# Patient Record
Sex: MALE | URBAN - METROPOLITAN AREA
[De-identification: names, ages, dates, MRNs, and addresses within clinical notes are randomized per-mention and may not be internally consistent; named-entity substitution may affect disease eponyms.]

---

## 2022-10-04 ENCOUNTER — HOSPITAL ENCOUNTER (OUTPATIENT)
Dept: ONCOLOGY | Age: 47
Setting detail: INFUSION SERIES
Discharge: HOME | End: 2022-10-04
Payer: COMMERCIAL

## 2022-10-04 VITALS
HEART RATE: 94 BPM | OXYGEN SATURATION: 95 % | TEMPERATURE: 97.9 F | WEIGHT: 178.57 LBS | RESPIRATION RATE: 16 BRPM | SYSTOLIC BLOOD PRESSURE: 148 MMHG | HEIGHT: 71 IN | BODY MASS INDEX: 25 KG/M2 | DIASTOLIC BLOOD PRESSURE: 84 MMHG

## 2022-10-04 LAB
A/G RATIO: 1.5 (ref 1.1–2.2)
ABO/RH: NORMAL
ALBUMIN SERPL-MCNC: 4.5 G/DL (ref 3.4–5)
ALP BLD-CCNC: 63 U/L (ref 40–129)
ALT SERPL-CCNC: 107 U/L (ref 10–40)
ANION GAP SERPL CALCULATED.3IONS-SCNC: 11 MMOL/L (ref 3–16)
ANTIBODY SCREEN: NORMAL
AST SERPL-CCNC: 40 U/L (ref 15–37)
BASOPHILS ABSOLUTE: 0 K/UL (ref 0–0.2)
BASOPHILS RELATIVE PERCENT: 0.7 %
BILIRUB SERPL-MCNC: 0.6 MG/DL (ref 0–1)
BILIRUBIN URINE: NEGATIVE
BLOOD, URINE: NEGATIVE
BUN BLDV-MCNC: 15 MG/DL (ref 7–20)
CALCIUM SERPL-MCNC: 9.2 MG/DL (ref 8.3–10.6)
CHLORIDE BLD-SCNC: 101 MMOL/L (ref 99–110)
CLARITY: CLEAR
CO2: 24 MMOL/L (ref 21–32)
COLOR: YELLOW
CREAT SERPL-MCNC: 0.9 MG/DL (ref 0.9–1.3)
EOSINOPHILS ABSOLUTE: 0.1 K/UL (ref 0–0.6)
EOSINOPHILS RELATIVE PERCENT: 2.2 %
GFR AFRICAN AMERICAN: >60
GFR NON-AFRICAN AMERICAN: >60
GLUCOSE BLD-MCNC: 108 MG/DL (ref 70–99)
GLUCOSE URINE: NEGATIVE MG/DL
HCT VFR BLD CALC: 44 % (ref 40.5–52.5)
HEMOGLOBIN: 14.9 G/DL (ref 13.5–17.5)
KETONES, URINE: NEGATIVE MG/DL
LACTATE DEHYDROGENASE: 175 U/L (ref 100–190)
LEUKOCYTE ESTERASE, URINE: NEGATIVE
LYMPHOCYTES ABSOLUTE: 1.3 K/UL (ref 1–5.1)
LYMPHOCYTES RELATIVE PERCENT: 23.6 %
MCH RBC QN AUTO: 31.3 PG (ref 26–34)
MCHC RBC AUTO-ENTMCNC: 33.9 G/DL (ref 31–36)
MCV RBC AUTO: 92.5 FL (ref 80–100)
MICROSCOPIC EXAMINATION: NORMAL
MONOCYTES ABSOLUTE: 0.4 K/UL (ref 0–1.3)
MONOCYTES RELATIVE PERCENT: 8 %
NEUTROPHILS ABSOLUTE: 3.6 K/UL (ref 1.7–7.7)
NEUTROPHILS RELATIVE PERCENT: 65.5 %
NITRITE, URINE: NEGATIVE
PDW BLD-RTO: 12.8 % (ref 12.4–15.4)
PH UA: 7 (ref 5–8)
PLATELET # BLD: 229 K/UL (ref 135–450)
PMV BLD AUTO: 7.2 FL (ref 5–10.5)
POTASSIUM REFLEX MAGNESIUM: 4 MMOL/L (ref 3.5–5.1)
PROTEIN UA: NEGATIVE MG/DL
RBC # BLD: 4.75 M/UL (ref 4.2–5.9)
REASON FOR REJECTION: NORMAL
REJECTED TEST: NORMAL
SODIUM BLD-SCNC: 136 MMOL/L (ref 136–145)
SPECIFIC GRAVITY UA: <=1.005 (ref 1–1.03)
TOTAL PROTEIN: 7.6 G/DL (ref 6.4–8.2)
URINE TYPE: NORMAL
UROBILINOGEN, URINE: 0.2 E.U./DL
WBC # BLD: 5.6 K/UL (ref 4–11)

## 2022-10-04 PROCEDURE — 85025 COMPLETE CBC W/AUTO DIFF WBC: CPT

## 2022-10-04 PROCEDURE — 86901 BLOOD TYPING SEROLOGIC RH(D): CPT

## 2022-10-04 PROCEDURE — 81003 URINALYSIS AUTO W/O SCOPE: CPT

## 2022-10-04 PROCEDURE — 80053 COMPREHEN METABOLIC PANEL: CPT

## 2022-10-04 PROCEDURE — 86900 BLOOD TYPING SEROLOGIC ABO: CPT

## 2022-10-04 PROCEDURE — 86850 RBC ANTIBODY SCREEN: CPT

## 2022-10-04 PROCEDURE — 83615 LACTATE (LD) (LDH) ENZYME: CPT

## 2022-10-04 NOTE — ONCOLOGY
[facility] donor seen in [address] [place] [address] infusion department for labs/teach prior to [facility] [place].    Donor given the below information re: the procedure. Donor verbalized understanding of using CHG soap morning of procedure and NPO after midnight evening before. [facility] [place] Procedure:    Before your surgery begins the nurse will start an IV to give you fluids before, during and after the procedure. You may also be given medication to make you sleepy before going into the operating room. Family members may remain with you until this time and then they will be asked to go to the Surgery Waiting Area. Once in the operating room, the nurses and doctors will be wearing masks. An anesthesiologist will give you medication through your IV to put you to sleep. Once you are asleep, a small tube will be placed down your windpipe (trachea) to maintain an open airway and assist your breathing. Then you will be turned over onto your stomach and covered with sterile drapes. The harvesting of your bone marrow is done by the insertion of a needle into the center of your hipbones. Bone marrow is then withdrawn through the needle. There will be 1 to 2 skin punctures (harvest sites) on each side of your pelvis. Once the procedure is finished, a large pressure bandage will be placed over the harvest sites to help prevent bleeding. Then you will be transferred to the recovery unit where your family can visit you. The entire hospital time for the bone marrow harvest should last 6-10 hours (allowing time for the induction of anesthesia, the harvest and recovery). Dont get up for the first time after the procedure without assistance, as you may experience dizziness. Ask for assistance from the nursing staff if you want to get up and walk around after donation. Even though you might be stiff and sore, many donors report feeling better if they are able to move around.     You will be discharged from the recovery room after you are awake and alert, able to eat and drink, able to urinate and able to walk. You may want to bring a pillow for the car to cushion your lower back on the trip home.

## 2022-10-05 ENCOUNTER — HOSPITAL ENCOUNTER (OUTPATIENT)
Dept: ONCOLOGY | Age: 47
Setting detail: INFUSION SERIES
Discharge: HOME | End: 2022-10-05

## 2022-10-05 NOTE — CONSULTS
[facility] History and Physical       Attending Physician: No att. providers found    Primary Care: No primary care provider on file. Referring MD: Stephanie Reagan MD  [address]  [place],  [address]    Name: Will Pryor :  1975  MRN:  [id number]    Admission: 10/4/2022      Date: 10/4/2022    Reason for visit: Stem Cell donor     History of Present Illness:   PT is a 67-year-old male with no significant medical history who is here for evaluation for stem cell donation. Patient is healthy and is not on any medications. He denies any infections recently, fevers or chills. He does not smoke drink alcohol or use any illicit drugs. He denies any past history of surgery. We discussed the procedure of a stem cell harvest as well as stem cell donation. He understands the involvement of general anesthesia and the risk associated with that he understands requirement of mechanical ventilation for the procedure. PMHX   None     PSHX  None     Social History  Denies any alcohol, tobacco or illicit drug abuse     Occupation:           ROS:  As noted above, otherwise remainder of 10-point ROS negative    Physical Exam:     Vital Signs:  BP (!) 148/84   Pulse 94   Temp 97.9 °F (36.6 °C) (Oral)   Resp 16   Ht 5' 11.26\" (1.81 m)   Wt 178 lb 9.2 oz (81 kg)   SpO2 95%   BMI 24.72 kg/m²     Weight:    Wt Readings from Last 3 Encounters:   10/04/22 178 lb 9.2 oz (81 kg)       KPS: 100% Normal no complaints; no evidence of disease    ECOG PS:  (0) Fully active, able to carry on all predisease performance without restriction    General: Awake, alert and oriented.   HEENT: normocephalic, PERRL, no scleral erythema or icterus, Oral mucosa moist and intact, throat clear  NECK: supple without palpable adenopathy  BACK: Straight negative CVAT  SKIN: warm dry and intact without lesions rashes or masses  CHEST: CTA bilaterally without use of accessory muscles  CV: Normal S1 S2, RRR, no MRG  ABD: NT ND normoactive BS, no palpable masses or hepatosplenomegaly  EXTREMITIES: without edema, denies calf tenderness  NEURO: CN II - XII grossly intact    Laboratory Data:   CBC:   Recent Labs     10/04/22  1417   WBC 5.6   HGB 14.9   HCT 44.0   MCV 92.5        BMP/Mag:  Recent Labs     10/04/22  1417      K 4.0      CO2 24   BUN 15   CREATININE 0.9     LIVP:   Recent Labs     10/04/22  1417   AST 40*   *   BILITOT 0.6   ALKPHOS 63     Coags: No results for input(s): PROTIME, INR, APTT in the last 72 hours. Uric Acid No results for input(s): LABURIC in the last 72 hours. ASSESSMENT AND PLAN:          PT is a 73-year-old male with no significant medical history who is here for evaluation for stem cell donation. plan for bone marrow harvest  - Discussed general anesthesia, procedure, risks associated with it   -  His labs are normal except for mild AST and ALT elevation  - No clinical symptoms, no RUQ pain, T bilirubin is normal. No signs of active infection. Hepatitis serologies unremarkable except for Hep A IgG is positive   --  By history and physical exam I see no underlying findings that would prohibit her from moving forward with stem cell donation and undergoing general anesthesia.       Ron Yeung DO

## 2022-10-20 NOTE — PROGRESS NOTES
Place patient label inside box (if no patient label, complete below)  Name:  :  MR#:     Sheila Stanford / [address]  I (we), Jaylene FRY (Patient Name) authorize DR. Leanna Garcia (Provider / Chi Iverson) and/or such assistants as may be selected by him/her, to perform the following operation/procedure(s): BONE MARROW HARVEST       Note: If unable to obtain consent prior to an emergent procedure, document the emergent reason in the medical record. This procedure has been explained to my (our) satisfaction and included in the explanation was: The intended benefit, nature, and extent of the procedure to be performed; The significant risks involved and the probability of success; Alternative procedures and methods of treatment; The dangers and probable consequences of such alternatives (including no procedure or treatment); The expected consequences of the procedure on my future health; Whether other qualified individuals would be performing important surgical tasks and/or whether  would be present to advise or support the procedure. I (we) understand that there are other risks of infection and other serious complications in the pre-operative/procedural and postoperative/procedural stages of my (our) care. I (we) have asked all of the questions which I (we) thought were important in deciding whether or not to undergo treatment or diagnosis. These questions have been answered to my (our) satisfaction. I (we) understand that no assurance can be given that the procedure will be a success, and no guarantee or warranty of success has been given to me (us). It has been explained to me (us) that during the course of the operation/procedure, unforeseen conditions may be revealed that necessitate extension of the original procedure(s) or different procedure(s) than those set forth in Paragraph 1.  I (we) authorize and request that the above-named physician, his/her assistants or his/her designees, perform procedures as necessary and desirable if deemed to be in my (our) best interest.     Revised 8/2/2021                                                                          Page 1 of 2       I acknowledge that health care personnel may be observing this procedure for the purpose of medical education or other specified purposes as may be necessary as requested and/or approved by my (our) physician. I (we) consent to the disposal by the hospital Pathologist of the removed tissue, parts or organs in accordance with hospital policy. I do ____ do not ____ consent to the use of a local infiltration pain blocking agent that will be used by my provider/surgical provider to help alleviate pain during my procedure. I do ____ do not ____ consent to an emergent blood transfusion in the case of a life-threatening situation that requires blood components to be administered. This consent is valid for 24 hours from the beginning of the procedure. This patient does ____ or does not ____ currently have a DNR status/order. If DNR order is in place, obtain Addendum to the Surgical Consent for ALL Patients with a DNR Order to address ruth-operative status for limited intervention or DNR suspension.      I have read and fully understand the above Consent for Operation/Procedure and that all blanks were completed before I signed the consent.   _____________________________       _____________________      ____/____am/pm  Signature of Patient or legal representative      Printed Name / Relationship            Date / Time   ____________________________       _____________________      ____/____am/pm  Witness to Signature                                    Printed Name                    Date / Time    If patient is unable to sign or is a minor, complete the following)  Patient is a minor, ____ years of age, or unable to sign because: ______________________________________________________________________________________________    If a phone consent is obtained, consent will be documented by using two health care professionals, each affirming that the consenting party has no questions and gives consent for the procedure discussed with the physician/provider.   _____________________          ____________________       _____/_____am/pm   2nd witness to phone consent        Printed name           Date / Time    Informed Consent:  I have provided the explanation described above in section 1 to the patient and/or legal representative.  I have provided the patient and/or legal representative with an opportunity to ask any questions about the proposed operation/procedure.   ___________________________          ____________________         ____/____am/pm  Provider / Proceduralist                            Printed name            Date / Time  Revised 8/2/2021                                                                      Page 2 of 2

## 2022-10-21 ENCOUNTER — ANESTHESIA EVENT (OUTPATIENT)
Dept: OPERATING ROOM | Age: 47
End: 2022-10-21
Payer: COMMERCIAL

## 2022-10-21 NOTE — PROGRESS NOTES
PRE-OP INSTRUCTIONS FOR SURGICAL PATIENTS          Our Pre-admission Testing Nurses tried and were unable to reach you today. Please read the attached instructions if you did not listen to your voicemail. Follow all instructions provided to you from your surgeon's office, including your ARRIVAL TIME. Arrange for someone to drive you home and be with you for the first 24 hours after discharge. NOTE: at this time ONLY 2 ADULTS may accompany you and everyone must be masked. Enter the MAIN entrance located on [address] and report to the surgical desk on the LEFT side of the lobby. Please park in the parking garage or there is free [employer] available after 7am for your use. Bring your  photo ID with you to register. Bring your medication list with you with dose and frequency listed (including over the counter medications)  Contact your ordering physician/surgeon for medication instructions as soon as possible, especially if taking blood thinners, aspirin, heart, or diabetic medication. Bariatric surgical patients need to call your surgeon if on diabetic medications (as some may need to be stopped 1-week preop)  A Pre-Surgical History and Physical MUST be completed WITHIN 30 DAYS OR LESS prior to your procedure by your Physician or an Urgent Care. DO NOT EAT ANYTHING 8 hours prior to arrival for surgery. You may have sips of WATER ONLY (up to 8 ounces) 4 hours prior to your arrival for surgery. Then nothing further 4 hours prior to arriving at hospital.   NOTE: ALL Gastric, Bariatric & Bowel surgery patients - you MUST follow your surgeon's instructions regarding eating/drinking as you will have very specific instructions to follow. If you did not receive these, call your surgeon's office immediately. No gum, candy, mints, or ice chips day of procedure. Please refrain from drinking alcohol the day before or day of your procedure.    Do not use any recreational marijuana at least 24 hours or street drugs (heroin, cocaine) at minimum 5 days prior to your procedure. Please do not smoke the day of your procedure. Dress in loose, comfortable clothing appropriate for redressing after your procedure. Do not wear jewelry (including body piercings), make-up, fingernail polish, lotion, powders, or metal hairclips. Contacts, glasses, dentures, and hearing aids will need to be removed prior to surgery. Bring your case(s) to protect them while you are in surgery. If you use a CPAP, please bring it with you on the day of your procedure. Do not shave or wax for 72 hours prior to procedure near your operative site. Leave all other valuables at home. IF there is a change in your physical condition for some reason, such as: a cold, fever, rash, cuts, sores, or any other infection, especially near your surgical site, contact your surgeon's office immediately. FOR WOMAN OF CHILDBEARING AGE ONLY- please make sure we can collect a urine sample upon arrival.     Instructions left on patient's voicemail.   Tiffany Live RN.10/21/2022 .7:47 AM

## 2022-10-21 NOTE — PROGRESS NOTES
Elevated ALT & AST - results reviewed with Hailey at [facility].  She stated they were aware and patient will follow up with PCP. Jayleen Hdz

## 2022-10-24 ENCOUNTER — ANESTHESIA (OUTPATIENT)
Dept: OPERATING ROOM | Age: 47
End: 2022-10-24
Payer: COMMERCIAL

## 2022-10-24 ENCOUNTER — HOSPITAL ENCOUNTER (OUTPATIENT)
Age: 47
Setting detail: OUTPATIENT SURGERY
Discharge: HOME | End: 2022-10-24
Attending: INTERNAL MEDICINE | Admitting: INTERNAL MEDICINE
Payer: COMMERCIAL

## 2022-10-24 VITALS
SYSTOLIC BLOOD PRESSURE: 123 MMHG | BODY MASS INDEX: 24.75 KG/M2 | RESPIRATION RATE: 14 BRPM | TEMPERATURE: 97 F | HEIGHT: 71 IN | HEART RATE: 71 BPM | OXYGEN SATURATION: 100 % | WEIGHT: 176.8 LBS | DIASTOLIC BLOOD PRESSURE: 84 MMHG

## 2022-10-24 DIAGNOSIS — G89.18 PAIN AT SURGICAL SITE: Primary | ICD-10-CM

## 2022-10-24 LAB
ABO/RH: NORMAL
ANTIBODY SCREEN: NORMAL
BASOPHILS ABSOLUTE: 0 K/UL (ref 0–0.2)
BASOPHILS RELATIVE PERCENT: 0.7 %
EOSINOPHILS ABSOLUTE: 0.1 K/UL (ref 0–0.6)
EOSINOPHILS RELATIVE PERCENT: 2.4 %
HCT VFR BLD CALC: 44.1 % (ref 40.5–52.5)
HEMOGLOBIN: 14.9 G/DL (ref 13.5–17.5)
LYMPHOCYTES ABSOLUTE: 2.1 K/UL (ref 1–5.1)
LYMPHOCYTES RELATIVE PERCENT: 36.3 %
MCH RBC QN AUTO: 31 PG (ref 26–34)
MCHC RBC AUTO-ENTMCNC: 33.7 G/DL (ref 31–36)
MCV RBC AUTO: 91.9 FL (ref 80–100)
MONOCYTES ABSOLUTE: 0.5 K/UL (ref 0–1.3)
MONOCYTES RELATIVE PERCENT: 8.5 %
NEUTROPHILS ABSOLUTE: 3 K/UL (ref 1.7–7.7)
NEUTROPHILS RELATIVE PERCENT: 52.1 %
PDW BLD-RTO: 12.6 % (ref 12.4–15.4)
PLATELET # BLD: 220 K/UL (ref 135–450)
PMV BLD AUTO: 7.8 FL (ref 5–10.5)
RBC # BLD: 4.79 M/UL (ref 4.2–5.9)
WBC # BLD: 5.7 K/UL (ref 4–11)

## 2022-10-24 PROCEDURE — 7100000001 HC PACU RECOVERY - ADDTL 15 MIN: Performed by: STUDENT IN AN ORGANIZED HEALTH CARE EDUCATION/TRAINING PROGRAM

## 2022-10-24 PROCEDURE — 85025 COMPLETE CBC W/AUTO DIFF WBC: CPT

## 2022-10-24 PROCEDURE — 3600000002 HC SURGERY LEVEL 2 BASE: Performed by: STUDENT IN AN ORGANIZED HEALTH CARE EDUCATION/TRAINING PROGRAM

## 2022-10-24 PROCEDURE — 6360000002 HC RX W HCPCS: Performed by: ANESTHESIOLOGY

## 2022-10-24 PROCEDURE — 86900 BLOOD TYPING SEROLOGIC ABO: CPT

## 2022-10-24 PROCEDURE — 7100000011 HC PHASE II RECOVERY - ADDTL 15 MIN: Performed by: STUDENT IN AN ORGANIZED HEALTH CARE EDUCATION/TRAINING PROGRAM

## 2022-10-24 PROCEDURE — 3700000001 HC ADD 15 MINUTES (ANESTHESIA): Performed by: STUDENT IN AN ORGANIZED HEALTH CARE EDUCATION/TRAINING PROGRAM

## 2022-10-24 PROCEDURE — 7100000000 HC PACU RECOVERY - FIRST 15 MIN: Performed by: STUDENT IN AN ORGANIZED HEALTH CARE EDUCATION/TRAINING PROGRAM

## 2022-10-24 PROCEDURE — 86901 BLOOD TYPING SEROLOGIC RH(D): CPT

## 2022-10-24 PROCEDURE — 6360000002 HC RX W HCPCS: Performed by: NURSE ANESTHETIST, CERTIFIED REGISTERED

## 2022-10-24 PROCEDURE — 3600000012 HC SURGERY LEVEL 2 ADDTL 15MIN: Performed by: STUDENT IN AN ORGANIZED HEALTH CARE EDUCATION/TRAINING PROGRAM

## 2022-10-24 PROCEDURE — 2709999900 HC NON-CHARGEABLE SUPPLY: Performed by: STUDENT IN AN ORGANIZED HEALTH CARE EDUCATION/TRAINING PROGRAM

## 2022-10-24 PROCEDURE — 2500000003 HC RX 250 WO HCPCS: Performed by: NURSE ANESTHETIST, CERTIFIED REGISTERED

## 2022-10-24 PROCEDURE — 3700000000 HC ANESTHESIA ATTENDED CARE: Performed by: STUDENT IN AN ORGANIZED HEALTH CARE EDUCATION/TRAINING PROGRAM

## 2022-10-24 PROCEDURE — 2580000003 HC RX 258: Performed by: ANESTHESIOLOGY

## 2022-10-24 PROCEDURE — 86850 RBC ANTIBODY SCREEN: CPT

## 2022-10-24 PROCEDURE — 7100000010 HC PHASE II RECOVERY - FIRST 15 MIN: Performed by: STUDENT IN AN ORGANIZED HEALTH CARE EDUCATION/TRAINING PROGRAM

## 2022-10-24 PROCEDURE — 2500000003 HC RX 250 WO HCPCS: Performed by: STUDENT IN AN ORGANIZED HEALTH CARE EDUCATION/TRAINING PROGRAM

## 2022-10-24 RX ORDER — ONDANSETRON 2 MG/ML
4 INJECTION INTRAMUSCULAR; INTRAVENOUS
Status: COMPLETED | OUTPATIENT
Start: 2022-10-24 | End: 2022-10-24

## 2022-10-24 RX ORDER — GLYCOPYRROLATE 0.2 MG/ML
INJECTION INTRAMUSCULAR; INTRAVENOUS PRN
Status: DISCONTINUED | OUTPATIENT
Start: 2022-10-24 | End: 2022-10-24 | Stop reason: SDUPTHER

## 2022-10-24 RX ORDER — SODIUM CHLORIDE 0.9 % (FLUSH) 0.9 %
5-40 SYRINGE (ML) INJECTION PRN
OUTPATIENT
Start: 2022-10-24

## 2022-10-24 RX ORDER — LIDOCAINE HYDROCHLORIDE 20 MG/ML
INJECTION, SOLUTION INTRAVENOUS PRN
Status: DISCONTINUED | OUTPATIENT
Start: 2022-10-24 | End: 2022-10-24 | Stop reason: SDUPTHER

## 2022-10-24 RX ORDER — OXYCODONE HYDROCHLORIDE AND ACETAMINOPHEN 5; 325 MG/1; MG/1
1 TABLET ORAL EVERY 4 HOURS PRN
OUTPATIENT
Start: 2022-10-24

## 2022-10-24 RX ORDER — SODIUM CHLORIDE 9 MG/ML
INJECTION, SOLUTION INTRAVENOUS PRN
OUTPATIENT
Start: 2022-10-24

## 2022-10-24 RX ORDER — OXYCODONE HYDROCHLORIDE AND ACETAMINOPHEN 5; 325 MG/1; MG/1
2 TABLET ORAL EVERY 4 HOURS PRN
OUTPATIENT
Start: 2022-10-24

## 2022-10-24 RX ORDER — SODIUM CHLORIDE 0.9 % (FLUSH) 0.9 %
5-40 SYRINGE (ML) INJECTION PRN
Status: DISCONTINUED | OUTPATIENT
Start: 2022-10-24 | End: 2022-10-24 | Stop reason: HOSPADM

## 2022-10-24 RX ORDER — LABETALOL HYDROCHLORIDE 5 MG/ML
10 INJECTION, SOLUTION INTRAVENOUS
Status: DISCONTINUED | OUTPATIENT
Start: 2022-10-24 | End: 2022-10-24 | Stop reason: HOSPADM

## 2022-10-24 RX ORDER — FENTANYL CITRATE 50 UG/ML
INJECTION, SOLUTION INTRAMUSCULAR; INTRAVENOUS PRN
Status: DISCONTINUED | OUTPATIENT
Start: 2022-10-24 | End: 2022-10-24 | Stop reason: SDUPTHER

## 2022-10-24 RX ORDER — MORPHINE SULFATE 4 MG/ML
2 INJECTION, SOLUTION INTRAMUSCULAR; INTRAVENOUS
OUTPATIENT
Start: 2022-10-24

## 2022-10-24 RX ORDER — SUCCINYLCHOLINE/SOD CL,ISO/PF 200MG/10ML
SYRINGE (ML) INTRAVENOUS PRN
Status: DISCONTINUED | OUTPATIENT
Start: 2022-10-24 | End: 2022-10-24 | Stop reason: SDUPTHER

## 2022-10-24 RX ORDER — MORPHINE SULFATE 4 MG/ML
4 INJECTION, SOLUTION INTRAMUSCULAR; INTRAVENOUS
OUTPATIENT
Start: 2022-10-24

## 2022-10-24 RX ORDER — ONDANSETRON 2 MG/ML
INJECTION INTRAMUSCULAR; INTRAVENOUS PRN
Status: DISCONTINUED | OUTPATIENT
Start: 2022-10-24 | End: 2022-10-24 | Stop reason: SDUPTHER

## 2022-10-24 RX ORDER — SODIUM CHLORIDE 0.9 % (FLUSH) 0.9 %
5-40 SYRINGE (ML) INJECTION EVERY 12 HOURS SCHEDULED
Status: DISCONTINUED | OUTPATIENT
Start: 2022-10-24 | End: 2022-10-24 | Stop reason: HOSPADM

## 2022-10-24 RX ORDER — SODIUM CITRATE 4 % (5 ML)
SYRINGE (ML) MISCELLANEOUS PRN
Status: DISCONTINUED | OUTPATIENT
Start: 2022-10-24 | End: 2022-10-24 | Stop reason: ALTCHOICE

## 2022-10-24 RX ORDER — SODIUM CHLORIDE 9 MG/ML
INJECTION, SOLUTION INTRAVENOUS PRN
Status: DISCONTINUED | OUTPATIENT
Start: 2022-10-24 | End: 2022-10-24 | Stop reason: HOSPADM

## 2022-10-24 RX ORDER — OXYCODONE HYDROCHLORIDE AND ACETAMINOPHEN 5; 325 MG/1; MG/1
1 TABLET ORAL EVERY 6 HOURS PRN
Qty: 12 TABLET | Refills: 0 | Status: SHIPPED | OUTPATIENT
Start: 2022-10-24 | End: 2022-10-27

## 2022-10-24 RX ORDER — ONDANSETRON 4 MG/1
4 TABLET, ORALLY DISINTEGRATING ORAL EVERY 8 HOURS PRN
OUTPATIENT
Start: 2022-10-24

## 2022-10-24 RX ORDER — PROPOFOL 10 MG/ML
INJECTION, EMULSION INTRAVENOUS PRN
Status: DISCONTINUED | OUTPATIENT
Start: 2022-10-24 | End: 2022-10-24 | Stop reason: SDUPTHER

## 2022-10-24 RX ORDER — KETOROLAC TROMETHAMINE 30 MG/ML
INJECTION, SOLUTION INTRAMUSCULAR; INTRAVENOUS PRN
Status: DISCONTINUED | OUTPATIENT
Start: 2022-10-24 | End: 2022-10-24 | Stop reason: SDUPTHER

## 2022-10-24 RX ORDER — MIDAZOLAM HYDROCHLORIDE 1 MG/ML
INJECTION INTRAMUSCULAR; INTRAVENOUS PRN
Status: DISCONTINUED | OUTPATIENT
Start: 2022-10-24 | End: 2022-10-24 | Stop reason: SDUPTHER

## 2022-10-24 RX ORDER — SODIUM CHLORIDE 0.9 % (FLUSH) 0.9 %
5-40 SYRINGE (ML) INJECTION EVERY 12 HOURS SCHEDULED
OUTPATIENT
Start: 2022-10-24

## 2022-10-24 RX ORDER — ONDANSETRON 2 MG/ML
4 INJECTION INTRAMUSCULAR; INTRAVENOUS EVERY 6 HOURS PRN
OUTPATIENT
Start: 2022-10-24

## 2022-10-24 RX ORDER — SODIUM CHLORIDE, SODIUM LACTATE, POTASSIUM CHLORIDE, CALCIUM CHLORIDE 600; 310; 30; 20 MG/100ML; MG/100ML; MG/100ML; MG/100ML
INJECTION, SOLUTION INTRAVENOUS CONTINUOUS
Status: DISCONTINUED | OUTPATIENT
Start: 2022-10-24 | End: 2022-10-24 | Stop reason: HOSPADM

## 2022-10-24 RX ORDER — ROCURONIUM BROMIDE 10 MG/ML
INJECTION, SOLUTION INTRAVENOUS PRN
Status: DISCONTINUED | OUTPATIENT
Start: 2022-10-24 | End: 2022-10-24 | Stop reason: SDUPTHER

## 2022-10-24 RX ORDER — DEXAMETHASONE SODIUM PHOSPHATE 4 MG/ML
INJECTION, SOLUTION INTRA-ARTICULAR; INTRALESIONAL; INTRAMUSCULAR; INTRAVENOUS; SOFT TISSUE PRN
Status: DISCONTINUED | OUTPATIENT
Start: 2022-10-24 | End: 2022-10-24 | Stop reason: SDUPTHER

## 2022-10-24 RX ORDER — HYDRALAZINE HYDROCHLORIDE 20 MG/ML
10 INJECTION INTRAMUSCULAR; INTRAVENOUS
Status: DISCONTINUED | OUTPATIENT
Start: 2022-10-24 | End: 2022-10-24 | Stop reason: HOSPADM

## 2022-10-24 RX ADMIN — DEXAMETHASONE SODIUM PHOSPHATE 4 MG: 4 INJECTION, SOLUTION INTRAMUSCULAR; INTRAVENOUS at 09:05

## 2022-10-24 RX ADMIN — MIDAZOLAM HYDROCHLORIDE 2 MG: 2 INJECTION, SOLUTION INTRAMUSCULAR; INTRAVENOUS at 07:33

## 2022-10-24 RX ADMIN — FENTANYL CITRATE 100 MCG: 50 INJECTION, SOLUTION INTRAMUSCULAR; INTRAVENOUS at 09:19

## 2022-10-24 RX ADMIN — PHENYLEPHRINE HYDROCHLORIDE 100 MCG: 10 INJECTION, SOLUTION INTRAMUSCULAR; INTRAVENOUS; SUBCUTANEOUS at 09:02

## 2022-10-24 RX ADMIN — PHENYLEPHRINE HYDROCHLORIDE 100 MCG: 10 INJECTION, SOLUTION INTRAMUSCULAR; INTRAVENOUS; SUBCUTANEOUS at 08:53

## 2022-10-24 RX ADMIN — PROPOFOL 200 MG: 10 INJECTION, EMULSION INTRAVENOUS at 07:42

## 2022-10-24 RX ADMIN — SODIUM CHLORIDE, POTASSIUM CHLORIDE, SODIUM LACTATE AND CALCIUM CHLORIDE: 600; 310; 30; 20 INJECTION, SOLUTION INTRAVENOUS at 08:05

## 2022-10-24 RX ADMIN — KETOROLAC TROMETHAMINE 30 MG: 30 INJECTION, SOLUTION INTRAMUSCULAR at 09:09

## 2022-10-24 RX ADMIN — ROCURONIUM BROMIDE 10 MG: 10 INJECTION INTRAVENOUS at 07:42

## 2022-10-24 RX ADMIN — Medication 140 MG: at 07:42

## 2022-10-24 RX ADMIN — LIDOCAINE HYDROCHLORIDE 60 MG: 20 INJECTION, SOLUTION INTRAVENOUS at 07:42

## 2022-10-24 RX ADMIN — GLYCOPYRROLATE 0.2 MG: 0.2 INJECTION INTRAMUSCULAR; INTRAVENOUS at 08:23

## 2022-10-24 RX ADMIN — FENTANYL CITRATE 100 MCG: 50 INJECTION, SOLUTION INTRAMUSCULAR; INTRAVENOUS at 07:42

## 2022-10-24 RX ADMIN — PHENYLEPHRINE HYDROCHLORIDE 100 MCG: 10 INJECTION, SOLUTION INTRAMUSCULAR; INTRAVENOUS; SUBCUTANEOUS at 08:26

## 2022-10-24 RX ADMIN — PHENYLEPHRINE HYDROCHLORIDE 100 MCG: 10 INJECTION, SOLUTION INTRAMUSCULAR; INTRAVENOUS; SUBCUTANEOUS at 09:11

## 2022-10-24 RX ADMIN — ONDANSETRON 4 MG: 2 INJECTION INTRAMUSCULAR; INTRAVENOUS at 11:37

## 2022-10-24 RX ADMIN — SODIUM CHLORIDE, POTASSIUM CHLORIDE, SODIUM LACTATE AND CALCIUM CHLORIDE: 600; 310; 30; 20 INJECTION, SOLUTION INTRAVENOUS at 06:35

## 2022-10-24 RX ADMIN — SODIUM CHLORIDE, POTASSIUM CHLORIDE, SODIUM LACTATE AND CALCIUM CHLORIDE: 600; 310; 30; 20 INJECTION, SOLUTION INTRAVENOUS at 09:23

## 2022-10-24 RX ADMIN — ONDANSETRON 4 MG: 2 INJECTION INTRAMUSCULAR; INTRAVENOUS at 09:04

## 2022-10-24 RX ADMIN — PHENYLEPHRINE HYDROCHLORIDE 100 MCG: 10 INJECTION, SOLUTION INTRAMUSCULAR; INTRAVENOUS; SUBCUTANEOUS at 08:33

## 2022-10-24 RX ADMIN — PHENYLEPHRINE HYDROCHLORIDE 100 MCG: 10 INJECTION, SOLUTION INTRAMUSCULAR; INTRAVENOUS; SUBCUTANEOUS at 08:38

## 2022-10-24 RX ADMIN — SODIUM CHLORIDE, POTASSIUM CHLORIDE, SODIUM LACTATE AND CALCIUM CHLORIDE: 600; 310; 30; 20 INJECTION, SOLUTION INTRAVENOUS at 08:50

## 2022-10-24 ASSESSMENT — PAIN SCALES - GENERAL
PAINLEVEL_OUTOF10: 2
PAINLEVEL_OUTOF10: 0

## 2022-10-24 ASSESSMENT — PAIN DESCRIPTION - LOCATION: LOCATION: BACK

## 2022-10-24 ASSESSMENT — PAIN DESCRIPTION - PAIN TYPE: TYPE: SURGICAL PAIN

## 2022-10-24 ASSESSMENT — PAIN DESCRIPTION - DESCRIPTORS: DESCRIPTORS: DISCOMFORT

## 2022-10-24 ASSESSMENT — PAIN - FUNCTIONAL ASSESSMENT: PAIN_FUNCTIONAL_ASSESSMENT: PREVENTS OR INTERFERES SOME ACTIVE ACTIVITIES AND ADLS

## 2022-10-24 ASSESSMENT — PAIN DESCRIPTION - FREQUENCY: FREQUENCY: CONTINUOUS

## 2022-10-24 NOTE — PROGRESS NOTES
PACU Transfer to [facility]    Vitals:    10/24/22 1030   BP: 120/76   Pulse: 73   Resp: 11   Temp: 97.1 °F (36.2 °C)   SpO2: 99%         Intake/Output Summary (Last 24 hours) at 10/24/2022 1041  Last data filed at 10/24/2022 1038  Gross per 24 hour   Intake 3140.8 ml   Output 1400 ml   Net 1740.8 ml       Pain assessment:  none  Pain Level: 0      Patient has all belongings at discharge from PACU. PACU RN called and updated patient's family.      Patient transferred to care of PAYAM RN.    10/24/2022 10:41 AM

## 2022-10-24 NOTE — PROGRESS NOTES
Patient arrived to PACU post [place] with Dr. Lisbeth Pastor. VSS on arrival. CRNA gave PACU RN report at bedside stating no complications during procedure. Surgical sites clean, dry and intact. Patient shows no signs of of pain at this time. Will continue to monitor. Patient made aware of 24/7 emergency services.

## 2022-10-24 NOTE — PRE-PROCEDURE INSTRUCTIONS
Patient seen and examined at bedside In pre-op. He is doing well, no issues, NPO overnight. Allergies reviewed.  Ok to proceed with the procedure today

## 2022-10-24 NOTE — ANESTHESIA POSTPROCEDURE EVALUATION
Department of Anesthesiology  Postprocedure Note    Patient: Mag Foster  MRN: [id number]  YOB: 1975  Date of evaluation: 10/24/2022      Procedure Summary     Date: 10/24/22 Room / Location: [address] [place] [address] [address]    Anesthesia Start: 3028 Anesthesia Stop: 5182    Procedure: BONE MARROW HARVEST (Back) Diagnosis:       Bone marrow donor      (Bone marrow donor [Z52.3])    Surgeons: Rob Garvin DO Responsible Provider: Liisa Litten, DO    Anesthesia Type: general ASA Status: 1          Anesthesia Type: No value filed.     Carrie Phase I: Carrie Score: 10    Acrrie Phase II:        Anesthesia Post Evaluation    Patient location during evaluation: PACU  Patient participation: complete - patient participated  Level of consciousness: awake and alert  Pain score: 0  Airway patency: patent  Nausea & Vomiting: no nausea and no vomiting  Cardiovascular status: blood pressure returned to baseline  Respiratory status: acceptable  Hydration status: euvolemic

## 2022-10-24 NOTE — PROGRESS NOTES
Bone marrow nurses came to bedside to talk with pt, and they stated consent needed to be changed to Dr. Roula Taylor for physician doing procedure. This RN printed new consent, and pt signed same. Pt has call light within reach. Denies any needs at this time.

## 2022-10-24 NOTE — H&P
Arvind Camarillo    [id number]    [facility] Same Day Surgery Update H & P  Department of General Surgery   Surgical Service   Pre-operative History and Physical  Last H & P within the last 30 days. DIAGNOSIS:   Bone marrow donor [Z52.3]    Procedure(s):  BONE MARROW HARVEST    History obtained from: Patient interview and EHR      HISTORY OF PRESENT ILLNESS:   The patient is a 56 y.o. male is a bone marrow donor who presents today for bone marrow harvest.     Illness Screening: Patient denies fever, chills, worsening cough, or close contact with sick individuals. Past Medical History:    History reviewed. No pertinent past medical history. Past Surgical History:    History reviewed. No pertinent surgical history. Medications Prior to Admission:      None      Allergies:  Patient has no allergy information on record. PHYSICAL EXAM:      /89   Pulse 72   Temp 97.9 °F (36.6 °C) (Temporal)   Resp 16   Ht 5' 11\" (1.803 m)   Wt 176 lb 12.8 oz (80.2 kg)   SpO2 97%   BMI 24.66 kg/m²      Airway:  Airway patent with no audible stridor    Heart:  Regular rate and rhythm, No murmur noted    Lungs:  No increased work of breathing, good air exchange, clear to auscultation bilaterally, no crackles or wheezing    Abdomen:  Soft, non-distended, non-tender, no rebound tenderness or guarding, and no masses palpated    ASSESSMENT AND PLAN     Patient is a 56 y.o. male with above specified procedure planned. 1.  The patients history and physical was obtained and signed off by the pre-admission testing department. Patient seen and focused exam done today- no new changes since last physical exam on 10/4/22    2. Access to ancillary services are available per request of the provider.     FABIOLA Valdes - JA     10/24/2022

## 2022-10-24 NOTE — DISCHARGE SUMMARY
The below information printed and explained to donor. Prescription medication given to donor. Post Operative Appointment:        You have a large pressure dressing on the back of your hips. This should stay in place until your follow up visit the day after the procedure. At that visit you will be assessed to make sure you are recovering as expected. At this visit the bandage is removed and the wounds are assessed and covered with Band-Aids. Do not shower until after this appointment. Discomfort and stiffness can last up to 10 days. The transplant team can provide you with documentation needed to miss work or school. How to Care for Yourself Following Bone Marrow [place]:    Go home and rest for a few days, keep your legs raised whenever possible. Take your pain medication before you absolutely need it. Be aware that some pain medications may cause nausea. Do not take it on an empty stomach. Be sure to report any side effects to your coordinator. Use only acetaminophen products as over-the-counter pain medication. These products will not prolong bleeding. Applying ice to the harvest sites for additional pain relief is also recommended. It is important to drink plenty of fluids preferably non-caffeine/non-alcoholic beverages. It takes time for your body to adapt to the loss of fluid. Continuing to move around without over-exerting yourself may help prevent stiffness in your back where the marrow was collected. You may experience difficulty in climbing stairs, lifting objects or bending over; do not push yourself to do any of these activities for several days. Bruises may continue to enlarge for a few days after the procedure. Do not expect to return to pre-collection activities for about two to three weeks, maybe longer for more strenuous activities. You may experience muscle pain and/or muscle fatigue in your back and legs. You may be more tired than usual for several days to weeks.   Plan to take at least a few days off from work; perhaps more if your job is physically demanding. If you have any questions or concerns regarding time off from work or school, please discuss them with your coordinator. Take advantage of the care and kindness offered by loved ones. Share the information provided for you regarding your care and recovery. You can always reach someone if you have questions or problems. During business hours (8 am-4:30 pm) you can call the [place] [address] office at [phone] and ask for one of the coordinators. After hours, on weekends and holidays you can reach the physician on call by calling [phone]. Ask for the bone marrow transplant physician on call. Follow up in Outpatient Infusion Department 3rd floor [facility] on 10/25/22 at 0900 to have lab work and dressing changed.

## 2022-10-24 NOTE — PROGRESS NOTES
Ambulatory Surgery/Procedure Discharge Note    Vitals:    10/24/22 1057   BP: 123/84   Pulse: 71   Resp: 14   Temp: 97 °F (36.1 °C)   SpO2: 100%   Patient meets criteria for discharge per Carrie score. In: 3140.8 [I.V.:3140.8]  Out: 1400     Restroom use offered before discharge. Yes    Pain assessment:  present - adequately treated  Pain Level: 2      1137 - Patient c/o of nausea. One time dose of Zofran given per order. Patient reports nausea relief after. Crackers and ginger ale given. Tolerated well. Pt and S.O./family states \"ready to go home\". Pt alert and oriented x4. IV removed. Denies N/V or pain. Dressing c,d,I to back. Voided prior to discharge. Discharge instructions given to pt and wife with pt permission. Pt and wife verbalized understanding of all instructions. Left with all belongings, prescriptions, and discharge instructions. Patient discharged to home/self care.  Patient discharged via wheel chair by transporter to waiting family/S.O.       10/24/2022 11:52 AM

## 2022-10-24 NOTE — DISCHARGE INSTRUCTIONS
Post Operative Appointment:        You have a large pressure dressing on the back of your hips. This should stay in place until your follow up visit the day after the procedure. At that visit you will be assessed to make sure you are recovering as expected. At this visit the bandage is removed and the wounds are assessed and covered with Band-Aids. Do not shower until after this appointment. Discomfort and stiffness can last up to 10 days. The transplant team can provide you with documentation needed to miss work or school. How to Care for Yourself Following Bone Marrow [place]:     Go home and rest for a few days, keep your legs raised whenever possible. Take your pain medication before you absolutely need it. Be aware that some pain medications may cause nausea. Do not take it on an empty stomach. Be sure to report any side effects to your coordinator. Use only acetaminophen products as over-the-counter pain medication. These products will not prolong bleeding. Applying ice to the harvest sites for additional pain relief is also recommended. It is important to drink plenty of fluids preferably non-caffeine/non-alcoholic beverages. It takes time for your body to adapt to the loss of fluid. Continuing to move around without over-exerting yourself may help prevent stiffness in your back where the marrow was collected. You may experience difficulty in climbing stairs, lifting objects or bending over; do not push yourself to do any of these activities for several days. Bruises may continue to enlarge for a few days after the procedure. Do not expect to return to pre-collection activities for about two to three weeks, maybe longer for more strenuous activities. You may experience muscle pain and/or muscle fatigue in your back and legs. You may be more tired than usual for several days to weeks. Plan to take at least a few days off from work; perhaps more if your job is physically demanding.  If you have any questions or concerns regarding time off from work or school, please discuss them with your coordinator. Take advantage of the care and kindness offered by loved ones. Share the information provided for you regarding your care and recovery. You can always reach someone if you have questions or problems. During business hours (8 am-4:30 pm) you can call the [place] [address] office at [phone] and ask for one of the coordinators. After hours, on weekends and holidays you can reach the physician on call by calling [phone]. Ask for the bone marrow transplant physician on call. Follow up in Outpatient Infusion Department 3rd floor [facility] on 10/25/22 at 0900 to have lab work and dressing changed. [address] [facility]    There are potential side effects of anesthesia or sedation you may experience for the first 24 hours. These side effects include:    Confusion or Memory loss, Dizziness, or Delayed Reaction Times   [x]A responsible person should be with you for the next 24 hours. Do not operate any vehicles (automobiles, bicycles, motorcycles) or power tools or machinery for 24 hours. Do not sign any legal documents or make any legal decisions for 24 hours. Do not drink alcohol for 24 hours or while taking narcotic pain medication. Nausea    [x]Start with light diet and progress to your normal diet as you feel like eating. However, if you experience nausea or repeated episodes of vomiting which persist beyond 12-24 hours, notify your physician. Once nausea has passed, remember to keep drinking fluids. Difficulty Passing Urine  [x]Drink extra amounts of fluid today. Notify your physician if you have not urinated within 8 hours after your procedure or you feel uncomfortable. Irritated Throat from a Breathing Tube  [x]Drink extra amounts of fluid today. Lozenges may help.     Muscle Aches  [x]You may experience some generalized body aches as your muscles recover from medications used to relax them during surgery. These will gradually subside. MEDICATION INSTRUCTIONS:  [x]Prescription(S) x   1  sent with you. Use as directed. When taking pain medications, you may experience the side effect of dizziness or drowsiness. Do not drink alcohol or drive when taking these medications. []Prescription(S) x          Called to Pharmacy Name and location:    [x]Give the list of your medications to your primary care physician on your next visit. Keep your med list updated and carry it with in case of emergencies. [x] Narcotic pain medications can cause the side effect of significant constipation. You may want to add a stool softener to your postoperative medication schedule or speak to your surgeon on how best to manage this side effect. NARCOTIC SAFETY:  Your pain medicine is only for you to take. Safely store your medicines. Store pills up high and out of reach of children and pets. Ensure safety caps are snapped tightly  Keep track of how many pills you have left    Unused medication can be disposed of by taking them to a drop-off box or take-back program that is authorized by the [facility]. Access to a site near you can be found on the [facility] website ([address]. [facility]). If you have a CPAP machine, it is very important that you use it daily during all periods of sleep and daytime rest during your recovery at home. Surgery and Anesthesia place a significant amount of stress on your body. Using your CPAP will help keep you safe and lessen the negative effects of that stress. FOLLOW-UP RECOVERY CARE:  [x]Call the office at [phone] for follow-up appointment and problems    Watch for these possible complications, symptoms, or side effects of anesthesia.   Call physician if they or any other problems occur:  Signs of INFECTION   > Fever over 101°     > Redness, swelling, hardness or warmth at the operative site   >Foul smelling or cloudy drainage at the operative site   Unrelieved PAIN  Unrelieved NAUSEA  Blood soaked dressing. (Some oozing may be normal)  Inability to urinate      Numb, pale, blue, cold or tingling extremity      Physician:  Dr. Caitlin Amado    The above instructions were reviewed with patient/significant other. The following additional patient specific information was reviewed with the patient/significant other:  [x]Procedure/physician specific instructions  [x]Medication information sheet(S) including potential side effects  []Jennifers egress test  []Pain Ball management  []FAQ Catheter associated blood stream infections  []FAQ Surgical Site Infections  []Other-    I have read and understand the instructions given to me: ____________________________________________   (Patient/S.O. Signature)            Date/time 10/24/2022 9:58 AM         PACU:  [phone]   M-F 700 AM - 7 PM      SAME DAY SERVICES:  [phone] M-F 7AM-6PM        If you smoke STOP. We care about your health!

## 2022-10-24 NOTE — PROGRESS NOTES
Alert and oriented x4. Pt walks w/ steady gait. Pt denies pain. RN with IV attempt #1, pt had vagal response. Lights were dimmed and head of bed lowered. Offered ice chips. Pt responded well to interventions. IV was place on attempt #2 in left hand. Pt wife,Latoya, will be picking up the pt ([phone]).

## 2022-10-24 NOTE — PROGRESS NOTES
Place patient label inside box (if no patient label, complete below)  Name:  :  MR#:     Bina Martinez / PROCEDURE  I (we), [facility] (Patient Name) authorize DR. Krystyna Quinones  (Provider / Ana Cobian) and/or such assistants as may be selected by him/her, to perform the following operation/procedure(s): BONE MARROW HARVEST       Note: If unable to obtain consent prior to an emergent procedure, document the emergent reason in the medical record. This procedure has been explained to my (our) satisfaction and included in the explanation was: The intended benefit, nature, and extent of the procedure to be performed; The significant risks involved and the probability of success; Alternative procedures and methods of treatment; The dangers and probable consequences of such alternatives (including no procedure or treatment); The expected consequences of the procedure on my future health; Whether other qualified individuals would be performing important surgical tasks and/or whether  would be present to advise or support the procedure. I (we) understand that there are other risks of infection and other serious complications in the pre-operative/procedural and postoperative/procedural stages of my (our) care. I (we) have asked all of the questions which I (we) thought were important in deciding whether or not to undergo treatment or diagnosis. These questions have been answered to my (our) satisfaction. I (we) understand that no assurance can be given that the procedure will be a success, and no guarantee or warranty of success has been given to me (us). It has been explained to me (us) that during the course of the operation/procedure, unforeseen conditions may be revealed that necessitate extension of the original procedure(s) or different procedure(s) than those set forth in Paragraph 1.  I (we) authorize and request that the above-named physician, his/her assistants or his/her designees, perform procedures as necessary and desirable if deemed to be in my (our) best interest.     Revised 8/2/2021                                                                          Page 1 of 2       I acknowledge that health care personnel may be observing this procedure for the purpose of medical education or other specified purposes as may be necessary as requested and/or approved by my (our) physician. I (we) consent to the disposal by the hospital Pathologist of the removed tissue, parts or organs in accordance with hospital policy. I do ____ do not ____ consent to the use of a local infiltration pain blocking agent that will be used by my provider/surgical provider to help alleviate pain during my procedure. I do ____ do not ____ consent to an emergent blood transfusion in the case of a life-threatening situation that requires blood components to be administered. This consent is valid for 24 hours from the beginning of the procedure. This patient does ____ or does not ____ currently have a DNR status/order. If DNR order is in place, obtain Addendum to the Surgical Consent for ALL Patients with a DNR Order to address ruth-operative status for limited intervention or DNR suspension.      I have read and fully understand the above Consent for Operation/Procedure and that all blanks were completed before I signed the consent.   _____________________________       _____________________      ____/____am/pm  Signature of Patient or legal representative      Printed Name / Relationship            Date / Time   ____________________________       _____________________      ____/____am/pm  Witness to Signature                                    Printed Name                    Date / Time    If patient is unable to sign or is a minor, complete the following)  Patient is a minor, ____ years of age, or unable to sign because: ______________________________________________________________________________________________    If a phone consent is obtained, consent will be documented by using two health care professionals, each affirming that the consenting party has no questions and gives consent for the procedure discussed with the physician/provider.   _____________________          ____________________       _____/_____am/pm   2nd witness to phone consent        Printed name           Date / Time    Informed Consent:  I have provided the explanation described above in section 1 to the patient and/or legal representative.  I have provided the patient and/or legal representative with an opportunity to ask any questions about the proposed operation/procedure.   ___________________________          ____________________         ____/____am/pm  Provider / Proceduralist                            Printed name            Date / Time  Revised 8/2/2021                                                                      Page 2 of 2

## 2022-10-24 NOTE — ONCOLOGY
TC met with donor in [facility]. Donor did use CHG soap today prior to procedure. The below information given to donor re: procedure. Donor's wife Hardeep Lynch will be called after the procedure. Bone Marrow [place] Procedure:    Before your surgery begins the nurse will start an IV to give you fluids before, during and after the procedure. You may also be given medication to make you sleepy before going into the operating room. Family members may remain with you until this time and then they will be asked to go to the Surgery Waiting Area. Once in the operating room, the nurses and doctors will be wearing masks. An anesthesiologist will give you medication through your IV to put you to sleep. Once you are asleep, a small tube will be placed down your windpipe (trachea) to maintain an open airway and assist your breathing. Then you will be turned over onto your stomach and covered with sterile drapes. The harvesting of your bone marrow is done by the insertion of a needle into the center of your hipbones. Bone marrow is then withdrawn through the needle. There will be 1 to 2 skin punctures (harvest sites) on each side of your pelvis. Once the procedure is finished, a large pressure bandage will be placed over the harvest sites to help prevent bleeding. Then you will be transferred to the recovery unit where your family can visit you. The entire hospital time for the bone marrow harvest should last 6-10 hours (allowing time for the induction of anesthesia, the harvest and recovery). Dont get up for the first time after the procedure without assistance, as you may experience dizziness. Ask for assistance from the nursing staff if you want to get up and walk around after donation. Even though you might be stiff and sore, many donors report feeling better if they are able to move around.     You will be discharged from the recovery room after you are awake and alert, able to eat and drink, able to

## 2022-10-24 NOTE — ANESTHESIA PRE PROCEDURE
Department of Anesthesiology  Preprocedure Note       Name:  Db Rivera   Age:  56 y.o.  :  1975                                          MRN:  [id number]         Date:  10/24/2022      Surgeon: Antonia Shukla):  Aimee Peters MD    Procedure: Procedure(s):  BONE MARROW HARVEST    Medications prior to admission:   Prior to Admission medications    Not on File       Current medications:    Current Facility-Administered Medications   Medication Dose Route Frequency Provider Last Rate Last Admin    lactated ringers infusion   IntraVENous Continuous Carole Life,  mL/hr at 10/24/22 0538 New Bag at 10/24/22 1338       Allergies:  No Known Allergies    Problem List:  There is no problem list on file for this patient. Past Medical History:  History reviewed. No pertinent past medical history. Past Surgical History:  History reviewed. No pertinent surgical history. Social History:    Social History     Tobacco Use    Smoking status: Never    Smokeless tobacco: Never   Substance Use Topics    Alcohol use: Not Currently                                Counseling given: Not Answered      Vital Signs (Current):   Vitals:    10/21/22 0746 10/24/22 0545   BP:  126/89   Pulse:  72   Resp:  16   Temp:  97.9 °F (36.6 °C)   TempSrc:  Temporal   SpO2:  97%   Weight: 178 lb (80.7 kg) 176 lb 12.8 oz (80.2 kg)   Height: 5' 11.25\" (1.81 m) 5' 11\" (1.803 m)                                              BP Readings from Last 3 Encounters:   10/24/22 126/89   10/04/22 (!) 148/84       NPO Status: Time of last liquid consumption:                         Time of last solid consumption: 0900                        Date of last liquid consumption: 10/23/22                        Date of last solid food consumption: 10/23/22    BMI:   Wt Readings from Last 3 Encounters:   10/24/22 176 lb 12.8 oz (80.2 kg)   10/04/22 178 lb 9.2 oz (81 kg)     Body mass index is 24.66 kg/m².     CBC:   Lab Results Component Value Date/Time    WBC 5.7 10/24/2022 06:30 AM    RBC 4.79 10/24/2022 06:30 AM    HGB 14.9 10/24/2022 06:30 AM    HCT 44.1 10/24/2022 06:30 AM    MCV 91.9 10/24/2022 06:30 AM    RDW 12.6 10/24/2022 06:30 AM     10/24/2022 06:30 AM       CMP:   Lab Results   Component Value Date/Time     10/04/2022 02:17 PM    K 4.0 10/04/2022 02:17 PM     10/04/2022 02:17 PM    CO2 24 10/04/2022 02:17 PM    BUN 15 10/04/2022 02:17 PM    CREATININE 0.9 10/04/2022 02:17 PM    GFRAA >60 10/04/2022 02:17 PM    AGRATIO 1.5 10/04/2022 02:17 PM    LABGLOM >60 10/04/2022 02:17 PM    GLUCOSE 108 10/04/2022 02:17 PM    PROT 7.6 10/04/2022 02:17 PM    CALCIUM 9.2 10/04/2022 02:17 PM    BILITOT 0.6 10/04/2022 02:17 PM    ALKPHOS 63 10/04/2022 02:17 PM    AST 40 10/04/2022 02:17 PM     10/04/2022 02:17 PM       POC Tests: No results for input(s): POCGLU, POCNA, POCK, POCCL, POCBUN, POCHEMO, POCHCT in the last 72 hours.     Coags: No results found for: PROTIME, INR, APTT    HCG (If Applicable): No results found for: PREGTESTUR, PREGSERUM, HCG, HCGQUANT     ABGs: No results found for: PHART, PO2ART, LGD9HFP, JZD7QOU, BEART, K9XEVAHU     Type & Screen (If Applicable):  No results found for: LABABO, LABRH    Drug/Infectious Status (If Applicable):  No results found for: HIV, HEPCAB    COVID-19 Screening (If Applicable): No results found for: COVID19        Anesthesia Evaluation  Patient summary reviewed and Nursing notes reviewed no history of anesthetic complications:   Airway: Mallampati: II  TM distance: >3 FB   Neck ROM: full  Mouth opening: > = 3 FB   Dental: normal exam         Pulmonary:Negative Pulmonary ROS breath sounds clear to auscultation                             Cardiovascular:Negative CV ROS            Rhythm: regular  Rate: normal                    Neuro/Psych:   Negative Neuro/Psych ROS              GI/Hepatic/Renal: Neg GI/Hepatic/Renal ROS       (-) hiatal hernia and GERD       Endo/Other: Negative Endo/Other ROS                    Abdominal:             Vascular: negative vascular ROS. Other Findings:           Anesthesia Plan      general     ASA 1       Induction: intravenous. MIPS: Prophylactic antiemetics administered. Anesthetic plan and risks discussed with patient. Plan discussed with CRNA.                     Regla Grijalva DO   10/24/2022

## 2022-10-25 ENCOUNTER — HOSPITAL ENCOUNTER (OUTPATIENT)
Dept: ONCOLOGY | Age: 47
Setting detail: INFUSION SERIES
Discharge: HOME | End: 2022-10-25
Payer: COMMERCIAL

## 2022-10-25 VITALS
RESPIRATION RATE: 16 BRPM | DIASTOLIC BLOOD PRESSURE: 73 MMHG | SYSTOLIC BLOOD PRESSURE: 108 MMHG | HEART RATE: 83 BPM | OXYGEN SATURATION: 98 % | TEMPERATURE: 98.3 F

## 2022-10-25 LAB
BASOPHILS ABSOLUTE: 0 K/UL (ref 0–0.2)
BASOPHILS RELATIVE PERCENT: 0.3 %
EOSINOPHILS ABSOLUTE: 0.1 K/UL (ref 0–0.6)
EOSINOPHILS RELATIVE PERCENT: 0.5 %
HCT VFR BLD CALC: 32.8 % (ref 40.5–52.5)
HEMOGLOBIN: 11.1 G/DL (ref 13.5–17.5)
LYMPHOCYTES ABSOLUTE: 2 K/UL (ref 1–5.1)
LYMPHOCYTES RELATIVE PERCENT: 16.8 %
MCH RBC QN AUTO: 30.9 PG (ref 26–34)
MCHC RBC AUTO-ENTMCNC: 33.7 G/DL (ref 31–36)
MCV RBC AUTO: 91.8 FL (ref 80–100)
MONOCYTES ABSOLUTE: 1 K/UL (ref 0–1.3)
MONOCYTES RELATIVE PERCENT: 8.3 %
NEUTROPHILS ABSOLUTE: 8.9 K/UL (ref 1.7–7.7)
NEUTROPHILS RELATIVE PERCENT: 74.1 %
PDW BLD-RTO: 13 % (ref 12.4–15.4)
PLATELET # BLD: 198 K/UL (ref 135–450)
PMV BLD AUTO: 7.4 FL (ref 5–10.5)
RBC # BLD: 3.58 M/UL (ref 4.2–5.9)
WBC # BLD: 12 K/UL (ref 4–11)

## 2022-10-25 PROCEDURE — 99211 OFF/OP EST MAY X REQ PHY/QHP: CPT

## 2022-10-25 PROCEDURE — 85025 COMPLETE CBC W/AUTO DIFF WBC: CPT

## 2022-10-25 ASSESSMENT — PAIN DESCRIPTION - LOCATION: LOCATION: OTHER (COMMENT)

## 2022-10-25 ASSESSMENT — PAIN SCALES - GENERAL: PAINLEVEL_OUTOF10: 3

## 2022-10-25 NOTE — ONCOLOGY
TC met with donor in [facility]. Outpt RN to do dressing change and draw CBC. Donor verbalizes understanding of instructions and denies pain. No c/o at this time. Donor to follow up with [facility] as needed. TC did give donor a copy of all his lab work, including work up labs. Dr. Janice Mensah is suggesting that donor follow up with family PCP due to elevated liver labs. Donor verbalized understanding of importance of follow up.

## 2022-10-25 NOTE — PROCEDURES
BONE MARROW HARVEST OPERATIVE REPORT    Talia Elizalde  1975   [id number]     11/24/2022     Pre - Operative Diagnosis:  Bone marrow transplant donor Allogeneic donor    Post - Operative Diagnosis:   Bone marrow transplant donor Allogeneic donor    Operation / Procedure:  Bone marrow harvest     Surgeon:  Dr Teresa Gr, Dr Raquel Gibbons    Assistant:  Jermaine Lawson     Anesthesia:  general         Description of Procedure:  After induction of general anesthesia, the patient was placed in the prone position, prepped and draped in the usual sterile manner. A time out was performed to verify patient, location(s), and procedure. Using 10 marrow harvest needles, multiple bone marrow aspirations were performed from the right and left posterior iliac crest.  Multiple skin penetrations were made on each side through which multiple bone marrow aspirations were obtained directing the needle to different areas of he marrow cavity. During this [1 hour and 30 minutes  minute procedure, a total volume of 1450 mls was collected and mixed during the sampling with 200 mls diluent media containing 50 U of preservative free heparin per ml. During the procedure the patient received 3000 mls of LR    30 mg of IV toradol        Estimated Blood Loss:  1450 mls of aspirate . Fluid Replacement:  3000 mls, [type of crystalloid]      After awakening from general anesthesia, the patient was transferred to PACU in stable condition. The patient was awakened from anesthetic, was extubated and was taken to the  recovery room in stable condition.

## 2022-10-25 NOTE — PROGRESS NOTES
Pt seen in [facility] today for labs and harvest dressing change. Pt verbalized understanding of discharge instructions. Pt discharged home with self.      Naya Thomas RN

## 2022-10-31 NOTE — ONCOLOGY
TC called and left a VM for this donor. Contact information given to donor to call back as needed. No further follow up will be done unless the donor has a need. [facility] and [facility] Co[facility].

## (undated) DEVICE — GOWN,SIRUS,POLYRNF,BRTHSLV,XL,30/CS: Brand: MEDLINE

## (undated) DEVICE — POSITIONER HD REST FOAM CMFRT TCH

## (undated) DEVICE — DRAPE,T,LAPARO,TRANS,STERILE: Brand: MEDLINE

## (undated) DEVICE — SET EXTN PRIMING 4.9ML L30IN INCL SLDE CLMP SPIN M LUERLOCK

## (undated) DEVICE — APPLICATOR MEDICATED 26 CC SOLUTION HI LT ORNG CHLORAPREP

## (undated) DEVICE — GENERAL: Brand: MEDLINE INDUSTRIES, INC.

## (undated) DEVICE — COVER,MAYO STAND,XL,STERILE: Brand: MEDLINE

## (undated) DEVICE — BONE MARROW HARVEST NEEDLE 11GA X 4IN: Brand: BONE MARROW HARVEST NEEDLE

## (undated) DEVICE — SOLUTION ANTICOAG CITRATE D PLSTC 500ML

## (undated) DEVICE — SYRINGE MED 50ML LUERLOCK TIP

## (undated) DEVICE — PAD,ABDOMINAL,5"X9",ST,LF,25/BX: Brand: MEDLINE INDUSTRIES, INC.

## (undated) DEVICE — TOWEL,OR,DSP,ST,BLUE,DLX,8/PK,10PK/CS: Brand: MEDLINE

## (undated) DEVICE — GLOVE SURG SZ 75 STD WHT LTX SYN POLYMER BEAD REINF ANTI RL

## (undated) DEVICE — COLLECTION KIT BON MARROW

## (undated) DEVICE — GLOVE SURG SZ 75 L12IN FNGR THK94MIL WHT POLYMER LTX BEAD

## (undated) DEVICE — SET ADMIN 25ML L117IN PMP MOD CK VLV RLER CLMP 2 SMRTSITE

## (undated) DEVICE — 1010 S-DRAPE TOWEL DRAPE 10/BX: Brand: STERI-DRAPE™

## (undated) DEVICE — POSITIONER HD SFT TCH BERRY FOAM DEVON

## (undated) DEVICE — PROTECTOR ULN NRV PUR FOAM HK LOOP STRP ANATOMICALLY

## (undated) DEVICE — PAD,NON-ADHERENT,3X8,STERILE,LF,1/PK: Brand: MEDLINE

## (undated) DEVICE — GAUZE,SPONGE,4"X4",16PLY,XRAY,STRL,LF: Brand: MEDLINE

## (undated) DEVICE — 3M™ MICROFOAM™ SURGICAL TAPE 4 ROLLS/CARTON 6 CARTONS/CASE 1528-3: Brand: 3M™ MICROFOAM™

## (undated) DEVICE — STANDARD HYPODERMIC NEEDLE,POLYPROPYLENE HUB: Brand: MONOJECT

## (undated) DEVICE — SYRINGE MED 10ML LUERLOCK TIP W/O SFTY DISP

## (undated) DEVICE — TOWEL,STOP FLAG GOLD N-W: Brand: MEDLINE

## (undated) DEVICE — WIPE,1ML,SUREPREP RAPID DRY,NO-STG,FILM: Brand: MEDLINE